# Patient Record
Sex: FEMALE | Race: ASIAN
[De-identification: names, ages, dates, MRNs, and addresses within clinical notes are randomized per-mention and may not be internally consistent; named-entity substitution may affect disease eponyms.]

---

## 2017-01-04 ENCOUNTER — HOSPITAL ENCOUNTER (OUTPATIENT)
Dept: HOSPITAL 80 - FIMAGING | Age: 52
End: 2017-01-04
Attending: GENERAL ACUTE CARE HOSPITAL
Payer: COMMERCIAL

## 2017-01-04 DIAGNOSIS — Z12.31: Primary | ICD-10-CM

## 2017-01-04 PROCEDURE — G0202 SCR MAMMO BI INCL CAD: HCPCS

## 2017-01-04 NOTE — MA
Screening Digital Mammogram With Tomosynthesis

 

Clinical Indications: Routine screening. 

 

Technique:  Standard digital cephalocaudal and tomosynthesis mediolateral oblique projections are obt
ained.  The digital images were processed by the [employer] computer aided detection system. 

 

Comparison: February 2015, November 2013

 

Breast density: B; There are scattered fibroglandular densities.

 

Findings: CAD was reviewed.  No suspicious findings are identified.

 

Impression: Negative mammogram. BI-RADS 1. 

 

Recommendation:   Routine screening is recommended in one year.

 

[facility] will send a result letter to the patient.

 

Negative mammography should not preclude additional workup of a clinically suspicious finding.

 

The patient's information is entered into a reminder system with a target due date for her next mammo
gram.

## 2018-03-15 ENCOUNTER — HOSPITAL ENCOUNTER (OUTPATIENT)
Dept: HOSPITAL 80 - FIMAGING | Age: 53
End: 2018-03-15
Attending: FAMILY MEDICINE
Payer: COMMERCIAL

## 2018-03-15 DIAGNOSIS — Z12.31: Primary | ICD-10-CM
